# Patient Record
Sex: MALE | Race: BLACK OR AFRICAN AMERICAN | Employment: UNEMPLOYED | URBAN - METROPOLITAN AREA
[De-identification: names, ages, dates, MRNs, and addresses within clinical notes are randomized per-mention and may not be internally consistent; named-entity substitution may affect disease eponyms.]

---

## 2022-01-08 ENCOUNTER — HOSPITAL ENCOUNTER (EMERGENCY)
Age: 32
Discharge: LEFT BEFORE DISCHARGE | End: 2022-01-08
Attending: EMERGENCY MEDICINE
Payer: MEDICARE

## 2022-01-08 ENCOUNTER — APPOINTMENT (OUTPATIENT)
Dept: GENERAL RADIOLOGY | Age: 32
End: 2022-01-08
Attending: EMERGENCY MEDICINE
Payer: MEDICARE

## 2022-01-08 PROCEDURE — 73610 X-RAY EXAM OF ANKLE: CPT

## 2022-01-08 PROCEDURE — 75810000275 HC EMERGENCY DEPT VISIT NO LEVEL OF CARE

## 2022-01-08 NOTE — ED TRIAGE NOTES
Patient refusing triage at this time. Yelling at this nurse for \" my you asking all these questions, You won't listen to me\". Upon asking patient questions regarding abd pain he lifted his shirt up yelling at this nurse. This nurse asked patient to exit the triage room at this time, patient continues yelling at this time \" I ain't answering these questions, get me somebody who is not being disrespectful to me\" Strong odor of THC to patient.

## 2022-01-08 NOTE — ED NOTES
Patient came out to nursing station and began cursing at ED staff: nursing supervisor offered to assist him and patient refused and cursed at nursing supervisor.   Patient continued cursing and left the ED

## 2022-01-08 NOTE — ED NOTES
Right ankle pain worse since Friday; 10/10, aching in nature hasnt taken anything for it. Abdominal pain past couple of days decreased appetite.  LBM 3 days ago; pt states when he urinates he feels like he needs to go more, frequency at night, denied incont, hematuria. + nausea; denied vomiting; when pt asked where it hurts he replied \"everywhere\"

## 2022-01-08 NOTE — ED NOTES
When asking pt the screening questions, pt began to raise his voice and states \"you got anymore questions for me Arnaud Montalvo. \" I then asked if pt smoked and pt replied with a raised voice \"yes darling. \" I then asked pt to not call me darling and I again gave pt my name he replied \"ok darling. \"

## 2022-01-08 NOTE — ED TRIAGE NOTES
Patient here with chronic ankle for \" years\", Abd pain, patient will not answer any questions regarding abd pain, yelling at this nurse after pulling up his sweatshirt and pointing to his stomach \" the pain is here where my stomach is\".